# Patient Record
Sex: FEMALE | Race: BLACK OR AFRICAN AMERICAN | URBAN - METROPOLITAN AREA
[De-identification: names, ages, dates, MRNs, and addresses within clinical notes are randomized per-mention and may not be internally consistent; named-entity substitution may affect disease eponyms.]

---

## 2022-12-29 ENCOUNTER — LAB ENCOUNTER (OUTPATIENT)
Dept: LAB | Age: 42
End: 2022-12-29
Attending: FAMILY MEDICINE
Payer: COMMERCIAL

## 2022-12-29 ENCOUNTER — OFFICE VISIT (OUTPATIENT)
Dept: FAMILY MEDICINE CLINIC | Facility: CLINIC | Age: 42
End: 2022-12-29
Payer: COMMERCIAL

## 2022-12-29 VITALS
DIASTOLIC BLOOD PRESSURE: 78 MMHG | TEMPERATURE: 98 F | HEIGHT: 66 IN | BODY MASS INDEX: 24.19 KG/M2 | WEIGHT: 150.5 LBS | HEART RATE: 92 BPM | SYSTOLIC BLOOD PRESSURE: 113 MMHG

## 2022-12-29 DIAGNOSIS — Z01.419 ENCOUNTER FOR GYNECOLOGICAL EXAMINATION WITHOUT ABNORMAL FINDING: Primary | ICD-10-CM

## 2022-12-29 DIAGNOSIS — N64.9 BREAST DISORDER: ICD-10-CM

## 2022-12-29 DIAGNOSIS — R10.84 GENERALIZED ABDOMINAL PAIN: ICD-10-CM

## 2022-12-29 DIAGNOSIS — D50.9 MICROCYTIC ANEMIA: ICD-10-CM

## 2022-12-29 DIAGNOSIS — Z01.419 ENCOUNTER FOR GYNECOLOGICAL EXAMINATION WITHOUT ABNORMAL FINDING: ICD-10-CM

## 2022-12-29 PROBLEM — Z86.69 HISTORY OF MIGRAINE HEADACHES: Status: ACTIVE | Noted: 2022-12-29

## 2022-12-29 PROBLEM — R92.2 DENSE BREASTS: Status: ACTIVE | Noted: 2022-12-29

## 2022-12-29 PROBLEM — R92.30 DENSE BREASTS: Status: ACTIVE | Noted: 2022-12-29

## 2022-12-29 PROBLEM — D53.9 MACROCYTIC ANEMIA: Status: ACTIVE | Noted: 2022-12-29

## 2022-12-29 LAB
ALBUMIN SERPL-MCNC: 3.6 G/DL (ref 3.4–5)
ALBUMIN/GLOB SERPL: 0.8 {RATIO} (ref 1–2)
ALP LIVER SERPL-CCNC: 41 U/L
ALT SERPL-CCNC: 23 U/L
ANION GAP SERPL CALC-SCNC: 7 MMOL/L (ref 0–18)
AST SERPL-CCNC: 16 U/L (ref 15–37)
BILIRUB SERPL-MCNC: 0.3 MG/DL (ref 0.1–2)
BUN BLD-MCNC: 10 MG/DL (ref 7–18)
BUN/CREAT SERPL: 14.9 (ref 10–20)
CALCIUM BLD-MCNC: 9.1 MG/DL (ref 8.5–10.1)
CHLORIDE SERPL-SCNC: 107 MMOL/L (ref 98–112)
CHOLEST SERPL-MCNC: 197 MG/DL (ref ?–200)
CO2 SERPL-SCNC: 23 MMOL/L (ref 21–32)
CREAT BLD-MCNC: 0.67 MG/DL
DEPRECATED RDW RBC AUTO: 45.1 FL (ref 35.1–46.3)
ERYTHROCYTE [DISTWIDTH] IN BLOOD BY AUTOMATED COUNT: 16 % (ref 11–15)
FASTING PATIENT LIPID ANSWER: YES
FASTING STATUS PATIENT QL REPORTED: YES
GFR SERPLBLD BASED ON 1.73 SQ M-ARVRAT: 113 ML/MIN/1.73M2 (ref 60–?)
GLOBULIN PLAS-MCNC: 4.4 G/DL (ref 2.8–4.4)
GLUCOSE BLD-MCNC: 93 MG/DL (ref 70–99)
HCT VFR BLD AUTO: 34.9 %
HDLC SERPL-MCNC: 84 MG/DL (ref 40–59)
HGB BLD-MCNC: 10.6 G/DL
IRON SATN MFR SERPL: 7 %
IRON SERPL-MCNC: 28 UG/DL
LDLC SERPL CALC-MCNC: 102 MG/DL (ref ?–100)
MCH RBC QN AUTO: 23.4 PG (ref 26–34)
MCHC RBC AUTO-ENTMCNC: 30.4 G/DL (ref 31–37)
MCV RBC AUTO: 77 FL
NONHDLC SERPL-MCNC: 113 MG/DL (ref ?–130)
OSMOLALITY SERPL CALC.SUM OF ELEC: 283 MOSM/KG (ref 275–295)
PLATELET # BLD AUTO: 321 10(3)UL (ref 150–450)
POTASSIUM SERPL-SCNC: 3.8 MMOL/L (ref 3.5–5.1)
PROT SERPL-MCNC: 8 G/DL (ref 6.4–8.2)
RBC # BLD AUTO: 4.53 X10(6)UL
SODIUM SERPL-SCNC: 137 MMOL/L (ref 136–145)
TIBC SERPL-MCNC: 411 UG/DL (ref 240–450)
TRANSFERRIN SERPL-MCNC: 276 MG/DL (ref 200–360)
TRIGL SERPL-MCNC: 60 MG/DL (ref 30–149)
VLDLC SERPL CALC-MCNC: 10 MG/DL (ref 0–30)
WBC # BLD AUTO: 5.2 X10(3) UL (ref 4–11)

## 2022-12-29 PROCEDURE — 84466 ASSAY OF TRANSFERRIN: CPT

## 2022-12-29 PROCEDURE — 85027 COMPLETE CBC AUTOMATED: CPT

## 2022-12-29 PROCEDURE — 83021 HEMOGLOBIN CHROMOTOGRAPHY: CPT

## 2022-12-29 PROCEDURE — 80053 COMPREHEN METABOLIC PANEL: CPT

## 2022-12-29 PROCEDURE — 83020 HEMOGLOBIN ELECTROPHORESIS: CPT

## 2022-12-29 PROCEDURE — 80061 LIPID PANEL: CPT

## 2022-12-29 PROCEDURE — 36415 COLL VENOUS BLD VENIPUNCTURE: CPT

## 2022-12-29 PROCEDURE — 3008F BODY MASS INDEX DOCD: CPT | Performed by: FAMILY MEDICINE

## 2022-12-29 PROCEDURE — 3078F DIAST BP <80 MM HG: CPT | Performed by: FAMILY MEDICINE

## 2022-12-29 PROCEDURE — 83540 ASSAY OF IRON: CPT

## 2022-12-29 PROCEDURE — 99386 PREV VISIT NEW AGE 40-64: CPT | Performed by: FAMILY MEDICINE

## 2022-12-29 PROCEDURE — 3074F SYST BP LT 130 MM HG: CPT | Performed by: FAMILY MEDICINE

## 2022-12-29 RX ORDER — FERROUS SULFATE 325(65) MG
1 TABLET ORAL DAILY
Qty: 90 TABLET | Refills: 1 | COMMUNITY

## 2022-12-29 RX ORDER — CLOBETASOL PROPIONATE 0.5 MG/G
OINTMENT TOPICAL
COMMUNITY
Start: 2022-10-20

## 2022-12-29 RX ORDER — CICLOPIROX 1 G/100ML
SHAMPOO TOPICAL
COMMUNITY
Start: 2021-09-21

## 2023-01-04 LAB
HGB A2 MFR BLD: 2 % (ref 1.5–3.5)
HGB PNL BLD ELPH: 98 % (ref 95.5–100)

## 2023-01-09 LAB — HPV I/H RISK 1 DNA SPEC QL NAA+PROBE: NEGATIVE

## 2023-09-18 ENCOUNTER — HOSPITAL ENCOUNTER (OUTPATIENT)
Age: 43
Discharge: HOME | End: 2023-09-18
Payer: COMMERCIAL

## 2023-09-18 VITALS
RESPIRATION RATE: 20 BRPM | TEMPERATURE: 97 F | DIASTOLIC BLOOD PRESSURE: 80 MMHG | HEART RATE: 74 BPM | OXYGEN SATURATION: 100 % | SYSTOLIC BLOOD PRESSURE: 131 MMHG

## 2023-09-18 DIAGNOSIS — S46.911A STRAIN OF RIGHT SHOULDER, INITIAL ENCOUNTER: Primary | ICD-10-CM

## 2023-09-18 PROCEDURE — 99203 OFFICE O/P NEW LOW 30 MIN: CPT | Performed by: NURSE PRACTITIONER

## 2023-09-18 RX ORDER — METHOCARBAMOL 500 MG/1
500 TABLET, FILM COATED ORAL 3 TIMES DAILY PRN
Qty: 15 TABLET | Refills: 0 | Status: SHIPPED | OUTPATIENT
Start: 2023-09-18

## 2023-09-18 RX ORDER — LIDOCAINE 4 G/G
1 PATCH TOPICAL EVERY 24 HOURS
Qty: 7 PATCH | Refills: 0 | Status: SHIPPED | OUTPATIENT
Start: 2023-09-18 | End: 2023-09-25

## 2023-09-18 RX ORDER — NAPROXEN 500 MG/1
500 TABLET ORAL 2 TIMES DAILY PRN
Qty: 20 TABLET | Refills: 0 | Status: SHIPPED | OUTPATIENT
Start: 2023-09-18 | End: 2023-09-28

## 2023-09-18 NOTE — ED INITIAL ASSESSMENT (HPI)
Pt here with complaint of right arm pain that began 5 days ago , pt states she was on the row machine 2 days prior to the pain starting , pt states pain begins on right shoulder and travels down right arm pt states she has a numbness and tingling sensation

## 2023-09-25 ENCOUNTER — TELEPHONE (OUTPATIENT)
Dept: FAMILY MEDICINE CLINIC | Facility: CLINIC | Age: 43
End: 2023-09-25

## 2023-09-25 NOTE — TELEPHONE ENCOUNTER
Patient calling ( identified name and  )in regards to right arm pain ,, has numbness and tingling  , also having slight neck pain   Went to IC on  for this pain , tried the Methocarbamol once but she does not  like to take medication     Was told to follow up with PCP,  arm issues  onset of over  10 days     Requesting to be seen by Dr. Harinder Gaytan   Date Time Provider Suha Gomez   2023 11:45 AM Maria A Acuna MD [facility]   10/12/2023  1:00 PM Steven Garcia MD [facility]

## 2023-09-28 ENCOUNTER — OFFICE VISIT (OUTPATIENT)
Dept: FAMILY MEDICINE CLINIC | Facility: CLINIC | Age: 43
End: 2023-09-28

## 2023-09-28 VITALS
BODY MASS INDEX: 25 KG/M2 | DIASTOLIC BLOOD PRESSURE: 79 MMHG | HEART RATE: 73 BPM | SYSTOLIC BLOOD PRESSURE: 124 MMHG | WEIGHT: 156.38 LBS | TEMPERATURE: 98 F

## 2023-09-28 DIAGNOSIS — R35.0 FREQUENT URINATION: ICD-10-CM

## 2023-09-28 DIAGNOSIS — Z12.31 BREAST CANCER SCREENING BY MAMMOGRAM: ICD-10-CM

## 2023-09-28 DIAGNOSIS — M54.12 CERVICAL RADICULOPATHY AT C7: Primary | ICD-10-CM

## 2023-09-28 LAB
BILIRUB UR QL: NEGATIVE
CLARITY UR: CLEAR
COLOR UR: COLORLESS
GLUCOSE UR-MCNC: NORMAL MG/DL
HGB UR QL STRIP.AUTO: NEGATIVE
KETONES UR-MCNC: NEGATIVE MG/DL
LEUKOCYTE ESTERASE UR QL STRIP.AUTO: NEGATIVE
NITRITE UR QL STRIP.AUTO: NEGATIVE
PH UR: 5 [PH] (ref 5–8)
PROT UR-MCNC: NEGATIVE MG/DL
SP GR UR STRIP: 1.01 (ref 1–1.03)
UROBILINOGEN UR STRIP-ACNC: NORMAL

## 2023-09-28 PROCEDURE — 3074F SYST BP LT 130 MM HG: CPT | Performed by: FAMILY MEDICINE

## 2023-09-28 PROCEDURE — 90686 IIV4 VACC NO PRSV 0.5 ML IM: CPT | Performed by: FAMILY MEDICINE

## 2023-09-28 PROCEDURE — 99213 OFFICE O/P EST LOW 20 MIN: CPT | Performed by: FAMILY MEDICINE

## 2023-09-28 PROCEDURE — 90471 IMMUNIZATION ADMIN: CPT | Performed by: FAMILY MEDICINE

## 2023-09-28 PROCEDURE — 3078F DIAST BP <80 MM HG: CPT | Performed by: FAMILY MEDICINE

## 2023-09-28 RX ORDER — TRAMADOL HYDROCHLORIDE 50 MG/1
50 TABLET ORAL EVERY 6 HOURS PRN
Qty: 30 TABLET | Refills: 0 | Status: SHIPPED | OUTPATIENT
Start: 2023-09-28

## 2023-09-28 RX ORDER — HYDROCODONE BITARTRATE AND ACETAMINOPHEN 5; 325 MG/1; MG/1
1 TABLET ORAL EVERY 6 HOURS PRN
Qty: 20 TABLET | Refills: 0 | Status: SHIPPED | OUTPATIENT
Start: 2023-09-28

## 2023-09-28 RX ORDER — METHYLPREDNISOLONE 4 MG/1
TABLET ORAL
Qty: 1 EACH | Refills: 0 | Status: SHIPPED | OUTPATIENT
Start: 2023-09-28

## 2023-09-28 RX ORDER — TRAMADOL HYDROCHLORIDE 50 MG/1
50 TABLET ORAL EVERY 6 HOURS PRN
Qty: 30 TABLET | Refills: 0 | Status: SHIPPED
Start: 2023-09-28 | End: 2023-09-28

## 2023-09-28 NOTE — PROGRESS NOTES
Subjective:   Patient ID: Trisha Lockhart is a 44year old female. Patient presents with pain radiating from right shoulder to second and third fingers. Intermittent, burning/electrical.  No weakness. See below. History/Other:   Review of Systems  Current Outpatient Medications   Medication Sig Dispense Refill    methylPREDNISolone (MEDROL) 4 MG Oral Tablet Therapy Pack As directed. 1 each 0    HYDROcodone-acetaminophen (NORCO) 5-325 MG Oral Tab Take 1 tablet by mouth every 6 (six) hours as needed for Pain. 20 tablet 0    Ferrous Sulfate Dried (FEOSOL) 200 (65 Fe) MG Oral Tab Take 1 tablet by mouth daily. With food and a little orange juice. 90 tablet 1    naproxen 500 MG Oral Tab Take 1 tablet (500 mg total) by mouth 2 (two) times daily as needed. (Patient not taking: Reported on 9/28/2023) 20 tablet 0    methocarbamol 500 MG Oral Tab Take 1 tablet (500 mg total) by mouth 3 (three) times daily as needed (for back pain). (Patient not taking: Reported on 9/28/2023) 15 tablet 0    Ciclopirox 1 % External Shampoo  (Patient not taking: Reported on 9/28/2023)      clobetasol 0.05 % External Ointment  (Patient not taking: Reported on 9/28/2023)       Allergies:No Known Allergies    Objective:   Physical Exam  Constitutional:       Appearance: Normal appearance. Cardiovascular:      Rate and Rhythm: Normal rate and regular rhythm. Pulses: Normal pulses. Heart sounds: Normal heart sounds. Pulmonary:      Effort: Pulmonary effort is normal.      Breath sounds: Normal breath sounds. Musculoskeletal:      Right lower leg: No edema. Left lower leg: No edema. Comments: Cervical spine decreased range of motion with pain. Spurling test positive for reproducing pain. Right shoulder with full range of motion. No focal tenderness. Neurological:      General: No focal deficit present. Mental Status: She is alert.       Comments: 1+ DTRs patellar bilaterally, biceps bilaterally Assessment & Plan:   Cervical radiculopathy at C7  (primary encounter diagnosis)-no known injury. Started 2 weeks ago. Right C7 distribution. Spurling test positive. Discussed ergonomics  Naproxen has not been effective, Norco as directed for severe pain. Discussed pros and cons of Medrol Dosepak, Rx given. May continue to use cyclobenzaprine at bedtime. Physical therapy referral given. Follow-up if not resolved in 1 month, sooner if worsening. Breast cancer screening by mammogram-order given    Frequent urination-check urinalysis today as patient concerned increase nephropathy in [facility] 's. Has mild urinary frequency. Orders Placed This Encounter      Urinalysis, Routine      Fluzone Quadrivalent 6mo+ 0.5mL      Meds This Visit:  Requested Prescriptions     Signed Prescriptions Disp Refills    methylPREDNISolone (MEDROL) 4 MG Oral Tablet Therapy Pack 1 each 0     Sig: As directed. HYDROcodone-acetaminophen (NORCO) 5-325 MG Oral Tab 20 tablet 0     Sig: Take 1 tablet by mouth every 6 (six) hours as needed for Pain.        Imaging & Referrals:  INFLUENZA VACCINE, QUAD, PRESERVATIVE FREE, 0.5 ML  PHYSICAL THERAPY EXTERNAL  [facility] JAYDE 2D+3D SCREENING BILAT (CPT=77067/67019)

## 2024-01-25 ENCOUNTER — LAB ENCOUNTER (OUTPATIENT)
Dept: LAB | Facility: HOSPITAL | Age: 44
End: 2024-01-25
Attending: INTERNAL MEDICINE
Payer: COMMERCIAL

## 2024-01-25 ENCOUNTER — OFFICE VISIT (OUTPATIENT)
Facility: CLINIC | Age: 44
End: 2024-01-25
Payer: COMMERCIAL

## 2024-01-25 VITALS
HEART RATE: 80 BPM | DIASTOLIC BLOOD PRESSURE: 85 MMHG | WEIGHT: 153 LBS | SYSTOLIC BLOOD PRESSURE: 123 MMHG | HEIGHT: 66 IN | BODY MASS INDEX: 24.59 KG/M2

## 2024-01-25 DIAGNOSIS — R10.10 UPPER ABDOMINAL PAIN: ICD-10-CM

## 2024-01-25 DIAGNOSIS — R10.13 DYSPEPSIA: Primary | ICD-10-CM

## 2024-01-25 DIAGNOSIS — R10.13 DYSPEPSIA: ICD-10-CM

## 2024-01-25 PROCEDURE — 3008F BODY MASS INDEX DOCD: CPT | Performed by: INTERNAL MEDICINE

## 2024-01-25 PROCEDURE — 99243 OFF/OP CNSLTJ NEW/EST LOW 30: CPT | Performed by: INTERNAL MEDICINE

## 2024-01-25 PROCEDURE — 83013 H PYLORI (C-13) BREATH: CPT

## 2024-01-25 PROCEDURE — 3074F SYST BP LT 130 MM HG: CPT | Performed by: INTERNAL MEDICINE

## 2024-01-25 PROCEDURE — 3079F DIAST BP 80-89 MM HG: CPT | Performed by: INTERNAL MEDICINE

## 2024-01-25 NOTE — H&P
[facility] - Gastroenterology                                                                                                               Reason for consult: ab pain    Requesting physician or provider: Karina Patino MD    Chief Complaint   Patient presents with    Abdominal Pain       HPI:   Leif Naik is a 43 year old year-old female with history of no sig PMH who presents for ab pain.    -currently doing well  -episodic hot painful abdomen, lasts for a few seconds, she feels salty spit in her mouth  -and then feels totally fine. No nausea.  -pain happens around belly button, can happen every few months  -has been happening for years  -Patient currently denies any GI symptoms of nausea, vomiting, dyspepsia, dysphagia, hematemesis,  change in bowel habits, thin stools, hematochezia, or melena.  Additionally there is no weight loss and no reported history of chest pain or shortness of breath.    -no family hx of IBD or CRC      Prior endoscopies:  none    Soc:  -no smoking  -no Etoh    Wt Readings from Last 6 Encounters:   01/25/24 153 lb (69.4 kg)   09/28/23 156 lb 6 oz (70.9 kg)   12/29/22 150 lb 8 oz (68.3 kg)        History, Medications, Allergies, ROS:      History reviewed. No pertinent past medical history.   History reviewed. No pertinent surgical history.   Family Hx:   Family History   Problem Relation Age of Onset    Hypertension Mother     Other (Parkinson) Mother       Social History:   Social History     Socioeconomic History    Marital status:    Tobacco Use    Smoking status: Never    Smokeless tobacco: Never   Vaping Use    Vaping Use: Never used   Substance and Sexual Activity    Alcohol use: Yes     Alcohol/week: 3.0 standard drinks of alcohol     Types: 3 Glasses of wine per week    Drug use: Never        Medications (Active prior to today's visit):  Current Outpatient Medications    Medication Sig Dispense Refill    Ferrous Sulfate Dried (FEOSOL) 200 (65 Fe) MG Oral Tab Take 1 tablet by mouth daily. With food and a little orange juice. 90 tablet 1    methylPREDNISolone (MEDROL) 4 MG Oral Tablet Therapy Pack As directed. (Patient not taking: Reported on 1/25/2024) 1 each 0    HYDROcodone-acetaminophen (NORCO) 5-325 MG Oral Tab Take 1 tablet by mouth every 6 (six) hours as needed for Pain. (Patient not taking: Reported on 1/25/2024) 20 tablet 0    traMADol 50 MG Oral Tab Take 1 tablet (50 mg total) by mouth every 6 (six) hours as needed for Pain. (Patient not taking: Reported on 1/25/2024) 30 tablet 0       Allergies:  No Known Allergies    ROS:   CONSTITUTIONAL:  negative for fevers, rigors  EYES:  negative for diplopia   RESPIRATORY:  negative for severe shortness of breath  CARDIOVASCULAR:  negative for crushing sub-sternal chest pain  GASTROINTESTINAL:  see HPI  GENITOURINARY:  negative for dysuria or gross hematuria  INTEGUMENT/BREAST:  SKIN:  negative for jaundice   ALLERGIC/IMMUNOLOGIC:  negative for hay fever  ENDOCRINE:  negative for cold intolerance and heat intolerance  MUSCULOSKELETAL:  negative for joint effusion/severe erythema  BEHAVIOR/PSYCH:  negative for psychotic behavior      PHYSICAL EXAM:   Blood pressure 123/85, pulse 80, height 5' 6\" (1.676 m), weight 153 lb (69.4 kg), last menstrual period 01/01/2024.    Gen- Patient appears comfortable and in no acute discomfort  HEENT: the sclera appears anicteric, oropharynx clear, mucus membranes appear moist  CV- regular rate and rhythm, the extremities are warm and well perfused   Lung- Moves air well; No labored breathing  Abdomen- soft, non-tender exam in all quadrants without rigidity or guarding, non-distended, no abnormal bowel sounds noted, no masses are palpated  Skin- No jaundice  Ext: no cyanosis, clubbing or edema is evident.   Neuro- Alert and interactive, and gross movements of extremities normal  Psych - appropriate,  non-agitated    Labs/Imaging:     Patient's pertinent labs and imaging were reviewed and discussed with patient today.      ASSESSMENT/PLAN:   Leif Naik is a 43 year old year-old female with history of no sig PMH who presents for ab pain.    #Dyepspia/ab pain- intermittent, lasts few seconds and goes away. Rout Hpylori and gallstones. Could be anxiety/stress related. No red flags to necessitate EGD.     #Hx of anemia- says it is chronic, runs in her family. Asked her to check labs, but she wants to do it with pcp    #Avg risk - due for screening at age 46.    Recommendations:    Hpylori breath test  Ultrasound  (RUQ)  Avoid NSAIDS, try tylenol instead (up to 3grams a day is safe)      Copy of this note sent to Dr. Patino.     Orders This Visit:  Orders Placed This Encounter   Procedures    Helicobacter Pylori Breath Test       Meds This Visit:  Requested Prescriptions      No prescriptions requested or ordered in this encounter       Imaging & Referrals:  US ABDOMEN LIMITED (CPT=76705)         CAMRYN Toledo MD  Pager: [phone]  1/25/2024        This note was partially prepared using Dragon Medical voice recognition dictation software. As a result, errors may occur. When identified, these errors have been corrected. While every attempt is made to correct errors during dictation, discrepancies may still exist.

## 2024-01-25 NOTE — PATIENT INSTRUCTIONS
Hpylori breath test  Ultrasound  (RUQ)  Avoid NSAIDS, try tylenol instead (up to 3grams a day is safe)

## 2024-01-27 ENCOUNTER — TELEPHONE (OUTPATIENT)
Facility: CLINIC | Age: 44
End: 2024-01-27

## 2024-01-27 DIAGNOSIS — B96.81 HELICOBACTER POSITIVE GASTRITIS: Primary | ICD-10-CM

## 2024-01-27 DIAGNOSIS — K29.70 HELICOBACTER POSITIVE GASTRITIS: Primary | ICD-10-CM

## 2024-01-27 LAB — H PYLORI BREATH TEST: POSITIVE

## 2024-01-27 RX ORDER — AMOXICILLIN 500 MG/1
1000 TABLET, FILM COATED ORAL 2 TIMES DAILY
Qty: 56 TABLET | Refills: 0 | Status: SHIPPED | OUTPATIENT
Start: 2024-01-27 | End: 2024-02-10

## 2024-01-27 RX ORDER — CLARITHROMYCIN 500 MG/1
500 TABLET, COATED ORAL 2 TIMES DAILY
Qty: 28 TABLET | Refills: 0 | Status: SHIPPED | OUTPATIENT
Start: 2024-01-27 | End: 2024-02-10

## 2024-01-27 RX ORDER — OMEPRAZOLE 20 MG/1
20 CAPSULE, DELAYED RELEASE ORAL
Qty: 28 CAPSULE | Refills: 0 | Status: SHIPPED | OUTPATIENT
Start: 2024-01-27 | End: 2024-02-10

## 2024-01-27 NOTE — TELEPHONE ENCOUNTER
H.pylori gastritis identified. The natural history of H.pylori was reviewed with the patient, as well as possible complications from H.pylori including stomach cancer, gastritis, dyspepsia, anemia and ulcers. We discussed treatment options and rationale for treatment, as well as testing for eradication. The patient understands the risks/benefits/side-effects of treatment and wants to proceed. The patient understands only 80-95% of patients have eradication to initial course of abx, and therefore may need another course of abx if fails first treatment. There may be side-effects during treatment and patient should call if any problems.  I have instructed patient to check H.pylori test  in 8-10 weeks to ensure eradication (order placed).    Will start w/triple therapy (PPI + amoxicillin + clarithromycin) x 14 days.

## 2024-02-02 ENCOUNTER — PATIENT MESSAGE (OUTPATIENT)
Facility: CLINIC | Age: 44
End: 2024-02-02

## 2024-02-02 NOTE — TELEPHONE ENCOUNTER
From: Leif Naik  To: HENRIETTA Toledo  Sent: 2/2/2024 6:55 AM CST  Subject: Prescription side effects     [facility]    I started the antibiotics yesterday and it did not go well. About an hour after I took it, there was burning from the top of my stomach, right under my chest. My whole stomach was in pain. My mouth tasted bitter. It lasted for hours and I had to stay in bed because of the pain. This morning, my stomach feels raw. I think I need something else. Please call to let me know how to proceed. I’m hesitant to take it this morning

## 2024-02-02 NOTE — TELEPHONE ENCOUNTER
D/w patient:    -stomach burning  -no rashes/sob/cp etc    >>will take a break for a week until she feels better, likely abx associated but also on iron which could be irritating her. Stop iron when taking abx and try again. If not tolerable, then can try levaquin based therapy.

## 2024-02-15 ENCOUNTER — PATIENT MESSAGE (OUTPATIENT)
Dept: FAMILY MEDICINE CLINIC | Facility: CLINIC | Age: 44
End: 2024-02-15

## 2024-02-15 NOTE — TELEPHONE ENCOUNTER
From: Leif Naik  To: Karina Patino  Sent: 2/15/2024 11:19 AM CST  Subject: Travel vaccines     Hello Dr. Patino,   Our family including the kids (5 and 7) are planning a trip to [place] this summer. What shots do we need to get before going and can we get the shots at your office? We are planning to go in June.     Thanks,

## 2024-02-16 NOTE — TELEPHONE ENCOUNTER
Yes, we should be able to do all in 30 minutes, but no time for other issues.  May need to get actual immunizations (especially injectable typhoid) at outside location.  We generally can use oral typhoid for over 6 years of age.  Adults should bring any history of immunizations.

## 2024-03-21 ENCOUNTER — TELEPHONE (OUTPATIENT)
Dept: GASTROENTEROLOGY | Facility: CLINIC | Age: 44
End: 2024-03-21

## 2024-03-21 NOTE — TELEPHONE ENCOUNTER
1st overdue imaging message sent     US ABDOMEN LIMITED (CPT=76705) (Order #[id number]) on 1/25/24

## 2024-05-09 ENCOUNTER — TELEPHONE (OUTPATIENT)
Facility: CLINIC | Age: 44
End: 2024-05-09

## 2024-05-09 NOTE — TELEPHONE ENCOUNTER
1st,overdue reminder letter mailed out to patient   Labs order:    Helicobacter Pylori Breath Test (Order #[id number]) on 1/27/24

## 2024-10-08 ENCOUNTER — OFFICE VISIT (OUTPATIENT)
Dept: FAMILY MEDICINE CLINIC | Facility: CLINIC | Age: 44
End: 2024-10-08

## 2024-10-08 VITALS
SYSTOLIC BLOOD PRESSURE: 128 MMHG | BODY MASS INDEX: 24.11 KG/M2 | DIASTOLIC BLOOD PRESSURE: 88 MMHG | OXYGEN SATURATION: 97 % | TEMPERATURE: 98 F | HEIGHT: 66 IN | WEIGHT: 150 LBS | HEART RATE: 72 BPM

## 2024-10-08 DIAGNOSIS — M25.561 CHRONIC PAIN OF RIGHT KNEE: ICD-10-CM

## 2024-10-08 DIAGNOSIS — Z00.00 ROUTINE PHYSICAL EXAMINATION: Primary | ICD-10-CM

## 2024-10-08 DIAGNOSIS — G89.29 CHRONIC PAIN OF RIGHT KNEE: ICD-10-CM

## 2024-10-08 DIAGNOSIS — R92.30 DENSE BREASTS: ICD-10-CM

## 2024-10-08 DIAGNOSIS — Z12.31 VISIT FOR SCREENING MAMMOGRAM: ICD-10-CM

## 2024-10-08 DIAGNOSIS — Z12.39 ENCOUNTER FOR BREAST CANCER SCREENING USING NON-MAMMOGRAM MODALITY: ICD-10-CM

## 2024-10-08 DIAGNOSIS — D50.9 MICROCYTIC ANEMIA: ICD-10-CM

## 2024-10-08 DIAGNOSIS — M25.551 RIGHT HIP PAIN: ICD-10-CM

## 2024-10-08 PROCEDURE — 3079F DIAST BP 80-89 MM HG: CPT | Performed by: FAMILY MEDICINE

## 2024-10-08 PROCEDURE — 90656 IIV3 VACC NO PRSV 0.5 ML IM: CPT | Performed by: FAMILY MEDICINE

## 2024-10-08 PROCEDURE — 99396 PREV VISIT EST AGE 40-64: CPT | Performed by: FAMILY MEDICINE

## 2024-10-08 PROCEDURE — 3074F SYST BP LT 130 MM HG: CPT | Performed by: FAMILY MEDICINE

## 2024-10-08 PROCEDURE — 90471 IMMUNIZATION ADMIN: CPT | Performed by: FAMILY MEDICINE

## 2024-10-08 PROCEDURE — 3008F BODY MASS INDEX DOCD: CPT | Performed by: FAMILY MEDICINE

## 2024-10-08 NOTE — PROGRESS NOTES
Subjective:   Patient ID: Leif Naik is a 44 year old female.    Patient presents for routine physical and issues as below.        History/Other:   Review of Systems   Constitutional: Negative.    Respiratory: Negative.     Cardiovascular: Negative.    Gastrointestinal: Negative.    Musculoskeletal:         Right knee and hip pain   Skin: Negative.    Neurological: Negative.      Current Outpatient Medications   Medication Sig Dispense Refill    Ferrous Sulfate Dried (FEOSOL) 200 (65 Fe) MG Oral Tab Take 1 tablet by mouth daily. With food and a little orange juice. 90 tablet 1     Allergies:No Known Allergies    Objective:   Physical Exam  Constitutional:       Appearance: Normal appearance.   Cardiovascular:      Rate and Rhythm: Normal rate and regular rhythm.      Heart sounds: Normal heart sounds.   Pulmonary:      Effort: Pulmonary effort is normal.      Breath sounds: Normal breath sounds.   Chest:   Breasts:     Right: Normal. No mass.      Left: Normal. No mass.   Abdominal:      Palpations: Abdomen is soft. There is no mass.      Tenderness: There is no abdominal tenderness.   Lymphadenopathy:      Cervical: No cervical adenopathy.      Upper Body:      Right upper body: No axillary adenopathy.      Left upper body: No axillary adenopathy.   Skin:     General: Skin is warm and dry.   Neurological:      Mental Status: She is alert and oriented to person, place, and time.         Assessment & Plan:   1. Routine physical examination-patient is , 2 children, menses regular, moderate flow 5 to 6 days.  Exercising regularly  Will return for labs  Flu vaccine given  Pap smear up-to-date   2. Visit for screening mammogram-mammogram order given.  Last mammogram extremely dense breasts.  Discussed benefit of adjuvant imaging with breast ultrasound to increase breast cancer detection rate.   3. Microcytic anemia-mother also with chronic microcytic anemia.  Hemoglobinopathy profile normal.  No menorrhagia.   Not a vegetarian.  Low iron indices.  Saw Dr. Toledo, had positive H. pylori breath test 1/2024.  Received treatment.  Recommend follow-up H. pylori breath testing.  Recheck CBC and iron profile with labs.   4. Encounter for breast cancer screening using non-mammogram modality-as above   5. Dense breasts-as above   6. Chronic pain of right knee-at superior patella quadriceps insertion.  Referred to PT   7. Right hip pain-at ileal insertion of ITB.  Referred to PT.       Orders Placed This Encounter   Procedures    CBC, Platelet; No Differential    Iron And Tibc    Lipid Panel    Basic Metabolic Panel (8)    INFLUENZA VACCINE, TRI, PRESERV FREE, 0.5 ML       Meds This Visit:  Requested Prescriptions      No prescriptions requested or ordered in this encounter       Imaging & Referrals:  PHYSICAL THERAPY EXTERNAL  INFLUENZA VACCINE, TRI, PRESERV FREE, 0.5 ML  [facility] JAYDE 2D+3D SCREENING BILAT (CPT=77067/64779)  US BREAST BILATERAL COMPLETE (CPT=76641-50)

## 2024-11-23 ENCOUNTER — TELEPHONE (OUTPATIENT)
Dept: FAMILY MEDICINE CLINIC | Facility: CLINIC | Age: 44
End: 2024-11-23

## 2024-11-23 DIAGNOSIS — Z87.898 HISTORY OF ABNORMAL MAMMOGRAM: Primary | ICD-10-CM

## 2024-11-23 NOTE — TELEPHONE ENCOUNTER
Patient called and is asking if she can get an order of diagnostic mammogram it  in August. Patient gets it done at [facility]

## 2024-11-23 NOTE — TELEPHONE ENCOUNTER
Dr. Patino diagnostic mammogram pended for your review.    I have pasted 5/2023 mammogram results below.    Narrative   05/31/2023 2:46 PM CDT   This result has an attachment that is not available.   HISTORY:  42 year old female presents for annual mammogram and follow-up probable  benign calcifications in the right breast.  No current breast complaints.  The patient has no family history of breast or ovarian cancer.    COMPARISON:  Prior breast examinations were compared.    TECHNIQUE:  Mammography Diagnostic Bilateral with Tomosynthesis    FINDINGS:  The breasts are extremely dense, which lowers the sensitivity of  mammography.  Previously described scattered round and amorphous  calcifications in the upper outer anterior right breast are stable  mammographically.  Calcifications in the upper inner left breast remain  stable since prior mammogram 11/18/2021.  There are no suspicious masses,  calcifications, or areas of architectural distortion.

## 2024-11-25 NOTE — TELEPHONE ENCOUNTER
Called and advised patient mammogram order has been placed.   She provided fax # [phone] for Priti.   Order faxed as requested.

## 2024-12-20 ENCOUNTER — LAB ENCOUNTER (OUTPATIENT)
Dept: LAB | Age: 44
End: 2024-12-20
Attending: FAMILY MEDICINE
Payer: COMMERCIAL

## 2024-12-20 ENCOUNTER — HOSPITAL ENCOUNTER (OUTPATIENT)
Dept: ULTRASOUND IMAGING | Age: 44
Discharge: HOME | End: 2024-12-20
Attending: INTERNAL MEDICINE
Payer: COMMERCIAL

## 2024-12-20 DIAGNOSIS — R10.13 DYSPEPSIA: ICD-10-CM

## 2024-12-20 DIAGNOSIS — D50.9 MICROCYTIC ANEMIA: ICD-10-CM

## 2024-12-20 DIAGNOSIS — Z00.00 ROUTINE PHYSICAL EXAMINATION: ICD-10-CM

## 2024-12-20 LAB
ANION GAP SERPL CALC-SCNC: 7 MMOL/L (ref 0–18)
BUN BLD-MCNC: 8 MG/DL (ref 9–23)
BUN/CREAT SERPL: 11.8 (ref 10–20)
CALCIUM BLD-MCNC: 9.2 MG/DL (ref 8.7–10.4)
CHLORIDE SERPL-SCNC: 113 MMOL/L (ref 98–112)
CHOLEST SERPL-MCNC: 196 MG/DL (ref ?–200)
CO2 SERPL-SCNC: 23 MMOL/L (ref 21–32)
CREAT BLD-MCNC: 0.68 MG/DL
DEPRECATED RDW RBC AUTO: 40.4 FL (ref 35.1–46.3)
EGFRCR SERPLBLD CKD-EPI 2021: 111 ML/MIN/1.73M2 (ref 60–?)
ERYTHROCYTE [DISTWIDTH] IN BLOOD BY AUTOMATED COUNT: 16.2 % (ref 11–15)
FASTING PATIENT LIPID ANSWER: YES
FASTING STATUS PATIENT QL REPORTED: YES
GLUCOSE BLD-MCNC: 85 MG/DL (ref 70–99)
HCT VFR BLD AUTO: 29.8 %
HDLC SERPL-MCNC: 81 MG/DL (ref 40–59)
HGB BLD-MCNC: 9 G/DL
IRON SATN MFR SERPL: 3 %
IRON SERPL-MCNC: 16 UG/DL
LDLC SERPL CALC-MCNC: 105 MG/DL (ref ?–100)
MCH RBC QN AUTO: 20.8 PG (ref 26–34)
MCHC RBC AUTO-ENTMCNC: 30.2 G/DL (ref 31–37)
MCV RBC AUTO: 68.8 FL
NONHDLC SERPL-MCNC: 115 MG/DL (ref ?–130)
OSMOLALITY SERPL CALC.SUM OF ELEC: 294 MOSM/KG (ref 275–295)
PLATELET # BLD AUTO: 319 10(3)UL (ref 150–450)
POTASSIUM SERPL-SCNC: 3.9 MMOL/L (ref 3.5–5.1)
RBC # BLD AUTO: 4.33 X10(6)UL
SODIUM SERPL-SCNC: 143 MMOL/L (ref 136–145)
TIBC SERPL-MCNC: 460 UG/DL (ref 250–425)
TRANSFERRIN SERPL-MCNC: 309 MG/DL (ref 250–380)
TRIGL SERPL-MCNC: 52 MG/DL (ref 30–149)
VLDLC SERPL CALC-MCNC: 9 MG/DL (ref 0–30)
WBC # BLD AUTO: 4.1 X10(3) UL (ref 4–11)

## 2024-12-20 PROCEDURE — 80061 LIPID PANEL: CPT

## 2024-12-20 PROCEDURE — 83540 ASSAY OF IRON: CPT

## 2024-12-20 PROCEDURE — 84466 ASSAY OF TRANSFERRIN: CPT

## 2024-12-20 PROCEDURE — 80048 BASIC METABOLIC PNL TOTAL CA: CPT

## 2024-12-20 PROCEDURE — 36415 COLL VENOUS BLD VENIPUNCTURE: CPT

## 2024-12-20 PROCEDURE — 85060 BLOOD SMEAR INTERPRETATION: CPT

## 2024-12-20 PROCEDURE — 85027 COMPLETE CBC AUTOMATED: CPT

## 2024-12-20 PROCEDURE — 76705 ECHO EXAM OF ABDOMEN: CPT | Performed by: INTERNAL MEDICINE

## 2024-12-26 NOTE — PROGRESS NOTES
Dear CATALINA,    I reviewed the results of your imaging test and the results show FATTY LIVER (also known as hepatic steatosis), which means you have extra fat in your liver. Most of the time, it doesn't cause symptoms. There is nothing urgent to do based on these findings. The good news is you can improve this condition with lifestyle changes.     Please read more about fatty liver, here:    [email or web address]    Of note, NO gallstones were seen.    Dr. Toledo

## 2025-01-17 ENCOUNTER — OFFICE VISIT (OUTPATIENT)
Dept: FAMILY MEDICINE CLINIC | Facility: CLINIC | Age: 45
End: 2025-01-17
Payer: COMMERCIAL

## 2025-01-17 ENCOUNTER — LAB ENCOUNTER (OUTPATIENT)
Dept: LAB | Age: 45
End: 2025-01-17
Attending: FAMILY MEDICINE
Payer: COMMERCIAL

## 2025-01-17 VITALS
DIASTOLIC BLOOD PRESSURE: 87 MMHG | BODY MASS INDEX: 25 KG/M2 | OXYGEN SATURATION: 98 % | SYSTOLIC BLOOD PRESSURE: 131 MMHG | WEIGHT: 157 LBS | HEART RATE: 78 BPM

## 2025-01-17 DIAGNOSIS — K29.70 HELICOBACTER POSITIVE GASTRITIS: ICD-10-CM

## 2025-01-17 DIAGNOSIS — D50.0 IRON DEFICIENCY ANEMIA DUE TO CHRONIC BLOOD LOSS: ICD-10-CM

## 2025-01-17 DIAGNOSIS — Z86.19 HISTORY OF HELICOBACTER PYLORI INFECTION: ICD-10-CM

## 2025-01-17 DIAGNOSIS — B96.81 HELICOBACTER POSITIVE GASTRITIS: ICD-10-CM

## 2025-01-17 DIAGNOSIS — D50.9 MICROCYTIC ANEMIA: Primary | ICD-10-CM

## 2025-01-17 PROCEDURE — 99214 OFFICE O/P EST MOD 30 MIN: CPT | Performed by: FAMILY MEDICINE

## 2025-01-17 PROCEDURE — 3075F SYST BP GE 130 - 139MM HG: CPT | Performed by: FAMILY MEDICINE

## 2025-01-17 PROCEDURE — 83013 H PYLORI (C-13) BREATH: CPT

## 2025-01-17 PROCEDURE — G2211 COMPLEX E/M VISIT ADD ON: HCPCS | Performed by: FAMILY MEDICINE

## 2025-01-17 PROCEDURE — 3079F DIAST BP 80-89 MM HG: CPT | Performed by: FAMILY MEDICINE

## 2025-01-17 NOTE — PROGRESS NOTES
Subjective:   Patient ID: Leif Naik is a 44 year old female.    Anemia  This is a chronic problem. The current episode started more than 1 month ago. Associated symptoms include fatigue. Pertinent negatives include no chest pain. Exacerbated by: Menses.       History/Other:   Review of Systems   Constitutional:  Positive for fatigue.   Cardiovascular:  Negative for chest pain.     Current Outpatient Medications   Medication Sig Dispense Refill    Ferrous Sulfate Dried (FEOSOL) 200 (65 Fe) MG Oral Tab Take 1 tablet by mouth daily. With food and a little orange juice. 90 tablet 1     Allergies:Allergies[1]    Objective:   Physical Exam  Constitutional:       Appearance: Normal appearance.   Cardiovascular:      Rate and Rhythm: Normal rate and regular rhythm.      Pulses: Normal pulses.      Heart sounds: Normal heart sounds.   Pulmonary:      Effort: Pulmonary effort is normal.      Breath sounds: Normal breath sounds.   Musculoskeletal:      Right lower leg: No edema.      Left lower leg: No edema.   Neurological:      Mental Status: She is alert.         Assessment & Plan:   1. Microcytic anemia    2. Iron deficiency anemia due to chronic blood loss    3. History of Helicobacter pylori infection    Patient had recent CBC with H&H 9.0/29.8, microcytic.  She had had anemia the year before, saw gastroenterology, tested positive for H. pylori, took prescribed antibiotic.  No melena or hematochezia.  She does have regular moderate-heavy menses.  She has difficulty remembering to take iron supplement.  She does experience some fatigue, dyspnea with exertion.  Recommend recheck H. pylori breath test as ordered by Dr. Toledo.  Take ferrous sulfate 325 mg daily with food and a little orange juice or vitamin C.  Plan to repeat CBC in 3 months prior to follow-up appointment.    No orders of the defined types were placed in this encounter.      Meds This Visit:  Requested Prescriptions      No prescriptions requested or  ordered in this encounter       Imaging & Referrals:  None         [1] No Known Allergies

## 2025-01-19 LAB — H PYLORI BREATH TEST: NEGATIVE

## 2025-01-21 NOTE — PROGRESS NOTES
Dear CATALINA,    I wanted to let you know I reviewed your recent H.pylori test. The test is NEGATIVE for H.pylori, meaning H.pylori was not detected. Therefore you do NOT need any further antibiotics for this condition.     Should you continue to have any digestive symptoms, please make an appointment to see me or follow any recommendations we discussed previously.      Dr. Toledo

## 2025-05-25 ENCOUNTER — PATIENT MESSAGE (OUTPATIENT)
Dept: FAMILY MEDICINE CLINIC | Facility: CLINIC | Age: 45
End: 2025-05-25

## 2025-05-27 NOTE — TELEPHONE ENCOUNTER
Dr Patino, please see patient's request for medication to take with family on trip to [facility], and advise?

## 2025-05-29 RX ORDER — AZITHROMYCIN 250 MG/1
500 TABLET, FILM COATED ORAL DAILY
Qty: 6 TABLET | Refills: 0 | Status: SHIPPED | OUTPATIENT
Start: 2025-05-29 | End: 2025-06-01

## 2025-05-29 RX ORDER — AZITHROMYCIN 500 MG/1
500 TABLET, FILM COATED ORAL 3 TIMES DAILY
Qty: 9 TABLET | Refills: 0 | Status: CANCELLED
Start: 2025-05-29 | End: 2025-06-01

## 2025-05-29 NOTE — TELEPHONE ENCOUNTER
Called patient, confirmed name and .    Received name and  of other family members.  All family members can swallow pills.  Mom asking if it can be taking to prevent or just when symptoms start.

## 2025-05-29 NOTE — TELEPHONE ENCOUNTER
Called patient, confirmed name and .    Patient advised of directions for use and verbalized understanding.

## 2025-05-29 NOTE — TELEPHONE ENCOUNTER
Only if mild to moderate diarrhea that lasts more than a few days, or diarrhea that is associated with fever, blood in the stool or severe diarrhea.

## (undated) NOTE — LETTER
5/9/2024              Leif Naik        [address] [facility] [address]         Dear Leif,    Our records indicate that the tests ordered for you by HENRIETTA Toledo MD  have not been done.  If you have, in fact, already completed the tests or you do not wish to have the tests done, please contact our office at THE NUMBER LISTED BELOW.  Otherwise, please proceed with the testing.  Enclosed is a duplicate order for your convenience.  Labs Order:    Helicobacter Pylori Breath Test (Order #[id number]) on 1/27/24       Sincerely,    HENRIETTA Toledo MD  [facility]  [address] [id number] [phone]

## (undated) NOTE — LETTER
Date & Time: 9/18/2023, 5:51 PM  Patient: Jenita Essex  Encounter Provider(s):    SKYLA Crowe       To Whom It May Concern:    Jenita Essex was seen and treated in our department on 9/18/2023. She should not return to work until 9/21/23 .     If you have any questions or concerns, please do not hesitate to call.        _____________________________  Physician/APC Signature